# Patient Record
Sex: FEMALE | Race: WHITE | NOT HISPANIC OR LATINO | URBAN - METROPOLITAN AREA
[De-identification: names, ages, dates, MRNs, and addresses within clinical notes are randomized per-mention and may not be internally consistent; named-entity substitution may affect disease eponyms.]

---

## 2024-10-07 NOTE — PROGRESS NOTES
Facial Plastic & Reconstructive Surgery    Reason for consult: Nasal obstruction    Referring provider: Self    Chief Complaint: Obstructed breathing    Constant, present year round, does not fluctuate. It affects the patients ability to sleep, exercise, and is troubling during the day.  Symptoms began: years ago    Nasal steroid or spray: Has attempted nasal steroid spray greater than 6 weeks without benefit    Site of obstruction: Bilateral    Previous Otolaryngology Provider: None  Previous documentation for this patient was extensively reviewed including specific reasons for evaluation. Complex nature of complaint and medical issues prompting evaluation for surgical consideration by facial plastic & reconstructive surgeon.    Previous surgery: Has had previous septo rhinoplasty    Previous CT/MRI imaging of the nasal cavity and sinuses: None    Trauma history: Denies    Sleep apnea or snoring history: Denies    Allergic rhinitis, sinusitis history: She has frequent sinus infections, she claims greater than 3/year.  She also feels like the infection goes to her lungs.  She has not had a CT scan in the system.    Smoking: Denies    Aesthetic concern: Nasal tip asymmetry, alar collapse, tip bubosity    Past Medical History  She has no past medical history on file.    Surgical History  She has a past surgical history that includes Hip surgery (12/08/2015); Tubal ligation (12/08/2015); Cholecystectomy (12/08/2015); and Lumbar laminectomy (12/08/2015).     Social History  She has no history on file for tobacco use, alcohol use, and drug use.    Family History  No family history on file.     Allergies  Patient has no allergy information on record.    Physical exam    General: Well-developed and well-nourished in appearance.  Skin: No rashes or concerning lesions on the visible portions of the skin.  Eyes: Extraocular movements intact. Visual fields grossly normal.  Ears: Pinna are normal in shape and position.  External canals are patent.  Oral Cavity/Oropharynx: Dentition is intact. Mucous membranes moist. No masses or lesions.  Respiratory: No respiratory distress. Quiet breathing without stertor or stridor.  Cardiovascular: Regular rate and rhythm. Warm extremities with equal pulses.  Psych: Normal mood and affect. Judgement and insight appropriate.  Neuro: Alert and oriented. CN II-XII grossly intact. No focal deficits.  Musculoskeletal: Gait intact. Moves all extremities well without apparent deformities.    A comprehensive facial exam was performed with the following highlights:    Nasal skin type: thin skin    External exam:  Tip: asymmetric, bulbous  Tip rotation: under  Projection: slight over  Dorsum: minimal hump  Base width: slight wide  Asymmetries: tip asymmetries  Alar columellar relationship: appropriate    Internal exam:   Septum: deviations present bl  Inferior turbinates: hypertrophied bl  Nasal valve angle: reduced bilaterally with dynamic and static collapse of the external valve apparent on photodocumentation    Intranasal examination performed with limited view on anterior rhinoscopy.                    Procedures:    Procedure: Rigid diagnostic nasal endoscopy  Surgeon: Radu Link MD  Anesthesia: None  Timeout: Performed  Findings: A 0-degree rigid endoscope was passed through the patient's bilateral naris. The first pass was along the floor of the nose to the nasopharynx. The second pass was to the area of the middle meatus. The third pass was to the sphenoethmoidal recess.    Septum: Deviation is S shaped with bilateral obstruction approximately 90% without perforations nor synechia.  Internal Nasal Valve: Angle is reduced, narrowing the nasal airway bilaterally.    Right nasal cavity:  Inferior turbinate: 2+  Inferior meatus: Clear, no discharge, no polyps/masses/lesions  Middle meatus: Clear, no discharge, no polyps/masses/lesions    Left nasal cavity:  Inferior turbinate: 2+  Inferior  meatus: Clear, no discharge, no polyps/masses/lesions  Middle meatus: Clear, no discharge, no polyps/masses/lesions  Nasopharynx: Clear, no discharge, no masses/lesions    Modified Ladonna Maneuver: Performed with a cotton tipped applicator, markedly improves breathing bilaterally.    Assessment - This patient has a significant mechanical nasal obstruction. 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 There is some dynamic nasal valve collapse as well. Correction of this nasal obstruction will require an open revision reconstructive rhinoplasty, major septoplasty, repair of nasal valve collapse with structural grafting, and a bilateral turbinate reduction. We discussed the medical necessity of this at length, as well as the risks and limitations of the procedures. All questions were answered.      Plan - Discussed revision open rhinoplasty, major septal repair with septoplasty, nasal valve repair with structural grafting, and inferior turbinate reduction with lateralization. We discussed cosmetic concerns as well.    We discussed obtaining CT sinus to further evaluate for sinus disease in setting of recurrent sinusitis    Today, I was in direct face-to-face consultation with the patient, of which greater than 50% of the time was spent discussing the diagnoses, treatment plan, counseling, and care coordination. The patient and/or caregiver voiced understanding of our conversation and all questions were satisfactorily answered.    Total time for this visit: 45 min

## 2024-10-10 ENCOUNTER — APPOINTMENT (OUTPATIENT)
Dept: OTOLARYNGOLOGY | Facility: CLINIC | Age: 64
End: 2024-10-10
Payer: COMMERCIAL

## 2024-10-10 VITALS — WEIGHT: 176.7 LBS | HEIGHT: 68 IN | BODY MASS INDEX: 26.78 KG/M2

## 2024-10-10 DIAGNOSIS — J32.9 CHRONIC SINUSITIS, UNSPECIFIED LOCATION: ICD-10-CM

## 2024-10-10 DIAGNOSIS — M95.0 NASAL DEFORMITY: ICD-10-CM

## 2024-10-10 DIAGNOSIS — R22.0 SWELLING OF NOSE: ICD-10-CM

## 2024-10-10 DIAGNOSIS — J34.8210 NASAL ALAR COLLAPSE: ICD-10-CM

## 2024-10-10 DIAGNOSIS — Z98.890 H/O NASAL SEPTOPLASTY: ICD-10-CM

## 2024-10-10 DIAGNOSIS — R09.81 NASAL CONGESTION: ICD-10-CM

## 2024-10-10 DIAGNOSIS — J32.9 SINUSITIS, UNSPECIFIED CHRONICITY, UNSPECIFIED LOCATION: ICD-10-CM

## 2024-10-10 DIAGNOSIS — J34.3 HYPERTROPHY OF INFERIOR NASAL TURBINATE: Primary | ICD-10-CM

## 2024-10-10 DIAGNOSIS — J34.2 DEVIATED SEPTUM: ICD-10-CM

## 2024-10-10 PROCEDURE — 3008F BODY MASS INDEX DOCD: CPT | Performed by: OTOLARYNGOLOGY

## 2024-10-10 PROCEDURE — 99204 OFFICE O/P NEW MOD 45 MIN: CPT | Performed by: OTOLARYNGOLOGY

## 2024-10-10 PROCEDURE — 31231 NASAL ENDOSCOPY DX: CPT | Performed by: OTOLARYNGOLOGY

## 2024-10-10 RX ORDER — CHOLECALCIFEROL (VITAMIN D3) 50 MCG
TABLET ORAL 2 TIMES DAILY
COMMUNITY

## 2024-10-10 RX ORDER — MONTELUKAST SODIUM 10 MG/1
1 TABLET ORAL NIGHTLY
COMMUNITY
Start: 2024-06-16

## 2024-10-10 RX ORDER — TIRZEPATIDE 10 MG/.5ML
INJECTION, SOLUTION SUBCUTANEOUS
COMMUNITY
Start: 2024-09-02

## 2024-10-10 RX ORDER — SODIUM CHLORIDE/ALOE VERA
1 GEL (GRAM) NASAL
COMMUNITY
Start: 2024-04-03

## 2024-10-10 RX ORDER — FLUTICASONE PROPIONATE 50 MCG
1 SPRAY, SUSPENSION (ML) NASAL
COMMUNITY
Start: 2024-06-16

## 2024-10-10 RX ORDER — SERTRALINE HYDROCHLORIDE 50 MG/1
50 TABLET, FILM COATED ORAL
COMMUNITY
Start: 2024-08-08

## 2024-10-10 RX ORDER — BUDESONIDE 180 UG/1
2 AEROSOL, POWDER RESPIRATORY (INHALATION) 2 TIMES DAILY
COMMUNITY
Start: 2023-09-05

## 2024-10-10 RX ORDER — LEVOTHYROXINE SODIUM 25 UG/1
25 TABLET ORAL
COMMUNITY
Start: 2024-05-10

## 2024-10-10 RX ORDER — ALBUTEROL SULFATE 90 UG/1
2 INHALANT RESPIRATORY (INHALATION) EVERY 6 HOURS PRN
COMMUNITY
Start: 2023-05-16

## 2024-10-10 RX ORDER — SYRINGE-NEEDLE,INSULIN,0.5 ML 28GX1/2"
2 SYRINGE, EMPTY DISPOSABLE MISCELLANEOUS
COMMUNITY
Start: 2024-04-03

## 2024-10-10 RX ORDER — ALPRAZOLAM 0.5 MG/1
TABLET ORAL
COMMUNITY

## 2024-10-10 RX ORDER — AZELASTINE 1 MG/ML
1 SPRAY, METERED NASAL 2 TIMES DAILY
COMMUNITY
Start: 2024-06-10

## 2024-10-10 ASSESSMENT — PATIENT HEALTH QUESTIONNAIRE - PHQ9
SUM OF ALL RESPONSES TO PHQ9 QUESTIONS 1 AND 2: 0
1. LITTLE INTEREST OR PLEASURE IN DOING THINGS: NOT AT ALL
2. FEELING DOWN, DEPRESSED OR HOPELESS: NOT AT ALL

## 2024-10-10 ASSESSMENT — PAIN SCALES - GENERAL: PAINLEVEL: 0-NO PAIN
